# Patient Record
Sex: MALE | Race: WHITE
[De-identification: names, ages, dates, MRNs, and addresses within clinical notes are randomized per-mention and may not be internally consistent; named-entity substitution may affect disease eponyms.]

---

## 2023-01-20 PROBLEM — Z00.00 ENCOUNTER FOR PREVENTIVE HEALTH EXAMINATION: Status: ACTIVE | Noted: 2023-01-20

## 2023-01-24 ENCOUNTER — APPOINTMENT (OUTPATIENT)
Dept: PEDIATRIC ALLERGY IMMUNOLOGY | Facility: CLINIC | Age: 43
End: 2023-01-24
Payer: MEDICAID

## 2023-01-24 VITALS
HEIGHT: 75 IN | SYSTOLIC BLOOD PRESSURE: 120 MMHG | WEIGHT: 265 LBS | DIASTOLIC BLOOD PRESSURE: 80 MMHG | BODY MASS INDEX: 32.95 KG/M2 | TEMPERATURE: 97.1 F

## 2023-01-24 PROCEDURE — 99204 OFFICE O/P NEW MOD 45 MIN: CPT

## 2023-01-24 NOTE — SOCIAL HISTORY
[Apartment] : [unfilled] lives in an apartment  [Radiator/Baseboard] : heating provided by radiator(s)/baseboard(s) [Window Units] : air conditioning provided by window units [Living Area] : in living area [None] : none [Humidifier] : does not use a humidifier [Dehumidifier] : does not use a dehumidifier [Dust Mite Covers] : does not have dust mite covers [Feather Pillows] : does not have feather pillows [Feather Comforter] : does not have a feather comforter [Bedroom] : not in the bedroom [Smokers in Household] : there are no smokers in the home

## 2023-01-24 NOTE — HISTORY OF PRESENT ILLNESS
[de-identified] : NGUYEN ELMORE is a 42 year yo male w/ a history of CU last seen in 2019 who returns today for itchiness to body for about a month he does not know what might cause the itchiness he is not taking anything for the itchiness he was a patient a long time ago he was tested and came back positive to some fish allergies he states he has been avoiding fish about 10 days ago he saw his primary care doctor and got blood work done he states it was sent to .\par \par He noticed the itching started again a month ago.

## 2023-02-14 ENCOUNTER — APPOINTMENT (OUTPATIENT)
Dept: PEDIATRIC ALLERGY IMMUNOLOGY | Facility: CLINIC | Age: 43
End: 2023-02-14
Payer: MEDICAID

## 2023-02-14 VITALS
SYSTOLIC BLOOD PRESSURE: 120 MMHG | WEIGHT: 250 LBS | TEMPERATURE: 97 F | BODY MASS INDEX: 31.08 KG/M2 | DIASTOLIC BLOOD PRESSURE: 80 MMHG | HEIGHT: 75 IN

## 2023-02-14 PROCEDURE — 99213 OFFICE O/P EST LOW 20 MIN: CPT

## 2023-02-14 NOTE — ASSESSMENT
[FreeTextEntry1] : 1. CU - repeat labs -continue cetirizine, montelukast, famotodine and add hydroxyzine prn at night\par \par 2. FA?- avoid fish. \par \par 3. Pruritis - no external rash at all, no redness, just itching.

## 2023-02-14 NOTE — HISTORY OF PRESENT ILLNESS
[de-identified] : NGUYEN ELMORE is a 42 year yo male w/ a history of CU last seen in 2019 who returns today for itchiness to body for about a month he does not know what might cause the itchiness he is not taking anything for the itchiness he was a patient a long time ago he was tested and came back positive to some fish allergies he states he has been avoiding fish about 10 days ago he saw his primary care doctor and got blood work done he states it was sent to .\par  \par \par He is here today for a follow he states he is still the same no changes and no new or worsening symptoms he states the medication given is not working he is still feeling the same he is still itchy and everything is still bothering him.\par \par He says in the past 2 days he gets only itching but no external rash or hives.

## 2023-02-27 ENCOUNTER — APPOINTMENT (OUTPATIENT)
Dept: PEDIATRIC ALLERGY IMMUNOLOGY | Facility: CLINIC | Age: 43
End: 2023-02-27
Payer: MEDICAID

## 2023-02-27 VITALS
TEMPERATURE: 97.1 F | SYSTOLIC BLOOD PRESSURE: 120 MMHG | HEIGHT: 75 IN | BODY MASS INDEX: 31.08 KG/M2 | DIASTOLIC BLOOD PRESSURE: 80 MMHG | WEIGHT: 250 LBS

## 2023-02-27 DIAGNOSIS — L50.8 OTHER URTICARIA: ICD-10-CM

## 2023-02-27 DIAGNOSIS — T78.03XA ANAPHYLACTIC REACTION DUE TO OTHER FISH, INITIAL ENCOUNTER: ICD-10-CM

## 2023-02-27 DIAGNOSIS — L29.9 PRURITUS, UNSPECIFIED: ICD-10-CM

## 2023-02-27 PROCEDURE — 99213 OFFICE O/P EST LOW 20 MIN: CPT

## 2023-02-27 RX ORDER — FAMOTIDINE 20 MG/1
20 TABLET, FILM COATED ORAL
Qty: 60 | Refills: 2 | Status: ACTIVE | COMMUNITY
Start: 2023-01-24 | End: 1900-01-01

## 2023-02-27 RX ORDER — CETIRIZINE HYDROCHLORIDE 10 MG/1
10 TABLET, COATED ORAL DAILY
Qty: 30 | Refills: 2 | Status: ACTIVE | COMMUNITY
Start: 2023-01-24 | End: 1900-01-01

## 2023-02-27 RX ORDER — MONTELUKAST 10 MG/1
10 TABLET, FILM COATED ORAL
Qty: 30 | Refills: 2 | Status: ACTIVE | COMMUNITY
Start: 2023-01-24 | End: 1900-01-01

## 2023-02-27 RX ORDER — HYDROXYZINE HYDROCHLORIDE 25 MG/1
25 TABLET ORAL 3 TIMES DAILY
Qty: 60 | Refills: 2 | Status: ACTIVE | COMMUNITY
Start: 2023-02-14 | End: 1900-01-01

## 2023-02-27 NOTE — HISTORY OF PRESENT ILLNESS
[de-identified] : NGUYEN ELMORE is a 42 year yo male w/ a history of CU last seen in 2019 who returns today for itchiness to body for about a month he does not know what might cause the itchiness he is not taking anything for the itchiness he was a patient a long time ago he was tested and came back positive to some fish allergies he states he has been avoiding fish about 10 days ago he saw his primary care doctor and got blood work done he states it was sent to .\par  \par \par He is here today for a follow he states he is still the same no changes and no new or worsening symptoms he states the medication given is not working he is still feeling the same he is still itchy and everything is still bothering him.\par \par He says in the past 2 days he gets only itching but no external rash or hives. \par \par \par He is here today for labs follow up itchiness to nose and eyes still having same symptoms, with itching but no hives or rash.